# Patient Record
(demographics unavailable — no encounter records)

---

## 2024-11-06 NOTE — HISTORY OF PRESENT ILLNESS
[FreeTextEntry1] : Around 2014, pt developed R 2nd MCP stiffness, pain in his L hand, L elbow, pain and stiffness in his neck and also pain in his shoulders. The pain can be severe and wakes him up at night, and is worse with cold weather. He had been to multiple rheumatologists for these symptoms, was previously diagnosed with RA and was started on methotrexate, then leflunomide, injection biologics, and then went to another rheumatologist who recommended IV biologics and pt declined. He did not have any improvement with the RA treatment. Pt then saw a doctor at [facility] who told him he has OA. He has not seen a rheumatologist in many years. 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 + Also R knee pain since last week. He tried using Voltaren gel with no significant improvement. The worst symptoms are in his R 2nd MCP, and he can't fully flex his R 2nd finger, making it difficult for him to perform fine motor tasks such as replacing small screws.  Physical exam: GEN: Pleasant, AAO man sitting on exam table in NAD SKIN: no rashes MOUTH: moist mucous membranes, no oral ulcers, normal oral aperture PULM: Clear to auscultation b/l CV: Regular rate and rhythm, no murmurs MSK Neck: limited flexion, lateral rotation Shoulders: limited abduction to approx 120 degrees b/l Elbows: Limited extension to 150 degrees on L, 170 degrees on R, + healed surgical scar on L lateral epicondyle Wrists: Slightly limited ROM, no effusions Hands: + bony prominence of R>L 2nd MCPs with TTP on R, limited flexion in R 2nd MCP Hips: Full ROM b/l Knees: no effusions, full ROM b/l Ankles: No effusions, full ROM b/l Feet: no effusions, no TTP EXT: no nail changes

## 2024-11-06 NOTE — ASSESSMENT
[FreeTextEntry1] : Joint pain and stiffness: Strong suspicion that pt's joint symptoms are due to severe osteoarthritis as opposed to RA. He may also have CPPD, especially as he has 2nd and 3rd MCP involvement.  - f/u x-rays of c-spine, shoulders, hands, knees - f/u labs for inflammatory arthritis - Offered pt steroid injection to R 2nd MCP but he deferred for now - Suggested for pt to try supplements and topical treatments, especially as he is trying to avoid medications  f/u depends on whether pt would like to try any other treatments for OA, will call pt with lab and x-ray results

## 2025-01-02 NOTE — ADDENDUM
[FreeTextEntry1] : Patient's note was transcribed with the assistance of a medical scribe under the supervision of Dr. Cruz. I, Dr. Cruz, have reviewed the patient's chart and agree that it aligns with my medical decisions. Reymundo Kovacs, our scribe, also served as a chaperone for physical examination purposes.

## 2025-01-02 NOTE — ASSESSMENT
[FreeTextEntry1] : CYRUS NASCIMENTO is a 63-year-old male hx BPH/LUTS on flomax who presented for consultation for Gross hematuria and patient declined cystoscopy 2021, found to have small bladder diverticulum, seen in April 2023 for unscheduled visit found to have left lower quadrant tenderness confirmed diverticulitis.  Presents with BPH and worsening lower urinary tract symptoms. We answered patient's questions regarding his prescription Flomax which does not take regularly and symptoms worsens when he does not take the medication  UA UCx - PSA today.  - cont double dose Flomax for chronic BPH/LUTS. - f/u 6-8 weeks for in office RBUS, also reassess chronic bladder diverticulum

## 2025-02-27 NOTE — ASSESSMENT
[FreeTextEntry1] : CYRUS NASCIMENTO is a 63-year-old male hx BPH/LUTS on flomax who presented for consultation for Gross hematuria and patient declined cystoscopy 2021, found to have small bladder diverticulum, seen in April 2023 for unscheduled visit found to have left lower quadrant tenderness confirmed diverticulitis. Ultrasound shows progression of BPH with large BPH with intravesical protrusion.  No bladder diverticulum seen. 2/2025  - d/c double dose Flomax + start Silodosin for chronic BPH/LUTS. Side effects discussed. - f/u 3 months for Uroflow/PVR. Next visit if LUTS do not improve he can consider also starting Finasteride.  We discussed side effects of all medications including finasteride and for now he prefers to stay within the same class.

## 2025-02-27 NOTE — HISTORY OF PRESENT ILLNESS
[FreeTextEntry1] : CYRUS NASCIMENTO is a 63-year-old male hx BPH/LUTS on flomax who presented for consultation for Gross hematuria and patient declined cystoscopy 2021, found to have small bladder diverticulum, seen in April 2023 for unscheduled visit found to have left lower quadrant tenderness confirmed diverticulitis.  Pt reports weak stream and occasionally has to strain to void.  Denies flank pain, gross hematuria, dysuria or associated symptoms.   PSA 01/02/2025 - 1.22 % free 25 UA - negative UCx - normal urogenital aquiles  (In-Office) RBUS 02/27/2025 - images independently reviewed by me - No hydronephrosis bilaterally. Moderate intravesical protrusion of the prostate. FINDINGS: Trace right renal pelvic fullness seen. Both kidneys are normal in size and echogenicity without hydronephrosis, stones or solid masses present. Ureteral jets visualized. Bladder wall appeared borderline thickened, measuring at 3mm. Bladder trabeculation seen. Post void residual present. Enlarged prostate (95 cc) and PVR 92 cc.  previously UDip today negative.  Labs 12/10/2024 Cr 1.00 GFR 85 Ca 9.8 K 4.4  PSA 04/13/2023 - 1.23  CT urogram March 2021 demonstrated BPH with small bladder diverticula and thickened bladder wall. Strongly recommended cystoscopy however, patient declined and neglected to follow-up.  Urine testing from March 2021: Cytology demonstrated atypical cells Urinalysis negative for RBCs PSA 1.16 Culture negative BMP demonstrates glucose 113  BUN=27 // Creat= 1.0 // e UCx 3/2021: ngtd  PSA= 1.16 % free 23%  Urine Cytology 3/2021: Atypical findings  CT scan images visualized from 3/2021: moderate intravesical trilobar protrusion KIDNEYS/URETERS/URINARY BLADDER: Unremarkable. ABDOMINOPELVIC NODES: Unremarkable.ADDITIONAL PELVIC ORGANS: Prostate enlargement with mild intravesical protrusion is noted. Mild urinary bladder wall thickening is noted. Small diverticulum is noted at the left aspect of the urinary bladder, approximately 0.8 cm in depth with a 0.3 cm-wide neck.   Denies  PMH including previous kidney stones, recurrent UTIs. Family History: No  malignancies Social History: Denies EtOH abuse or illicit drug abuse. Former cigarette smoker.   Old records reviewed  Lab work with 3/1/21: CBC within normal limits BMP shows a creatinine of 1.1 by estimated GFR 73 Urinalysis negative urine culture no growth

## 2025-02-27 NOTE — ADDENDUM
[FreeTextEntry1] : Patient's note was transcribed with the assistance of a medical scribe under the supervision of Dr. Cruz. I, Dr. Cruz, have reviewed the patient's chart and agree that it aligns with my medical decisions. Reymundo Kovacs, our scribe, also served as a chaperone for physical examination purposes.  The submitted E/M billing level for this visit reflects the total time spent on the day of the visit including face-to-face time spent with the patient, non-face-to-face review of medical records and relevant information, documentation, and asynchronous communication with the patient after a visit via phone, email, or patients EHR portal after the visit.  The medical records reviewed are either scanned into the chart or reviewed with the patient using a patients electronic medical records portal for patients with records not available to [facility] via electronic transmission platforms from other institutions and labs.  Time spend counseling and performing coordination of care was also included in determining the appropriate EM billing level.